# Patient Record
Sex: MALE | Race: BLACK OR AFRICAN AMERICAN | URBAN - METROPOLITAN AREA
[De-identification: names, ages, dates, MRNs, and addresses within clinical notes are randomized per-mention and may not be internally consistent; named-entity substitution may affect disease eponyms.]

---

## 2022-01-06 ENCOUNTER — OFFICE VISIT (OUTPATIENT)
Dept: URBAN - METROPOLITAN AREA CLINIC 17 | Facility: CLINIC | Age: 67
End: 2022-01-06

## 2022-04-07 ENCOUNTER — DASHBOARD ENCOUNTERS (OUTPATIENT)
Age: 67
End: 2022-04-07

## 2022-04-07 ENCOUNTER — WEB ENCOUNTER (OUTPATIENT)
Dept: URBAN - METROPOLITAN AREA CLINIC 17 | Facility: CLINIC | Age: 67
End: 2022-04-07

## 2022-04-07 ENCOUNTER — OFFICE VISIT (OUTPATIENT)
Dept: URBAN - METROPOLITAN AREA CLINIC 17 | Facility: CLINIC | Age: 67
End: 2022-04-07
Payer: COMMERCIAL

## 2022-04-07 DIAGNOSIS — Z12.11 COLON CANCER SCREENING: ICD-10-CM

## 2022-04-07 PROCEDURE — 99203 OFFICE O/P NEW LOW 30 MIN: CPT | Performed by: INTERNAL MEDICINE

## 2022-04-07 RX ORDER — SODIUM, POTASSIUM,MAG SULFATES 17.5-3.13G
354 ML SOLUTION, RECONSTITUTED, ORAL ORAL
Qty: 354 ML | Refills: 0 | OUTPATIENT
Start: 2022-04-07 | End: 2022-04-08

## 2022-04-07 RX ORDER — SPIRONOLACTONE 25 MG/1
TABLET ORAL
Qty: 90 | Status: ACTIVE | COMMUNITY

## 2022-04-07 RX ORDER — AMLODIPINE BESYLATE 5 MG/1
TABLET ORAL
Qty: 60 | Status: ACTIVE | COMMUNITY

## 2022-04-07 RX ORDER — CARVEDILOL 25 MG/1
TAKE 1 TABLET BY MOUTH TWICE DAILY TABLET, FILM COATED ORAL
Qty: 180 | Refills: 0 | Status: ACTIVE | COMMUNITY

## 2022-04-22 ENCOUNTER — OFFICE VISIT (OUTPATIENT)
Dept: URBAN - METROPOLITAN AREA SURGERY CENTER 30 | Facility: SURGERY CENTER | Age: 67
End: 2022-04-22

## 2022-07-13 ENCOUNTER — OFFICE VISIT (OUTPATIENT)
Dept: URBAN - METROPOLITAN AREA SURGERY CENTER 30 | Facility: SURGERY CENTER | Age: 67
End: 2022-07-13